# Patient Record
Sex: FEMALE | Race: WHITE | NOT HISPANIC OR LATINO | URBAN - METROPOLITAN AREA
[De-identification: names, ages, dates, MRNs, and addresses within clinical notes are randomized per-mention and may not be internally consistent; named-entity substitution may affect disease eponyms.]

---

## 2018-05-18 ENCOUNTER — OUTPATIENT (OUTPATIENT)
Dept: OUTPATIENT SERVICES | Facility: HOSPITAL | Age: 83
LOS: 1 days | Discharge: HOME | End: 2018-05-18

## 2018-05-18 DIAGNOSIS — S99.911A UNSPECIFIED INJURY OF RIGHT ANKLE, INITIAL ENCOUNTER: ICD-10-CM

## 2018-05-18 DIAGNOSIS — S93.402A SPRAIN OF UNSPECIFIED LIGAMENT OF LEFT ANKLE, INITIAL ENCOUNTER: ICD-10-CM

## 2018-05-18 DIAGNOSIS — M79.672 PAIN IN LEFT FOOT: ICD-10-CM

## 2018-05-23 DIAGNOSIS — N95.9 UNSPECIFIED MENOPAUSAL AND PERIMENOPAUSAL DISORDER: ICD-10-CM

## 2018-05-23 DIAGNOSIS — Z13.820 ENCOUNTER FOR SCREENING FOR OSTEOPOROSIS: ICD-10-CM

## 2018-05-30 DIAGNOSIS — M79.672 PAIN IN LEFT FOOT: ICD-10-CM

## 2018-05-30 DIAGNOSIS — S99.911A UNSPECIFIED INJURY OF RIGHT ANKLE, INITIAL ENCOUNTER: ICD-10-CM

## 2018-05-30 DIAGNOSIS — S93.402A SPRAIN OF UNSPECIFIED LIGAMENT OF LEFT ANKLE, INITIAL ENCOUNTER: ICD-10-CM

## 2018-05-30 DIAGNOSIS — Z02.9 ENCOUNTER FOR ADMINISTRATIVE EXAMINATIONS, UNSPECIFIED: ICD-10-CM

## 2018-06-22 ENCOUNTER — OUTPATIENT (OUTPATIENT)
Dept: OUTPATIENT SERVICES | Facility: HOSPITAL | Age: 83
LOS: 1 days | Discharge: HOME | End: 2018-06-22

## 2018-06-22 DIAGNOSIS — M79.671 PAIN IN RIGHT FOOT: ICD-10-CM

## 2018-07-23 ENCOUNTER — OUTPATIENT (OUTPATIENT)
Dept: OUTPATIENT SERVICES | Facility: HOSPITAL | Age: 83
LOS: 1 days | Discharge: HOME | End: 2018-07-23

## 2018-07-23 DIAGNOSIS — R05 COUGH: ICD-10-CM

## 2018-07-24 ENCOUNTER — OUTPATIENT (OUTPATIENT)
Dept: OUTPATIENT SERVICES | Facility: HOSPITAL | Age: 83
LOS: 1 days | Discharge: HOME | End: 2018-07-24

## 2018-07-24 DIAGNOSIS — R52 PAIN, UNSPECIFIED: ICD-10-CM

## 2018-08-15 ENCOUNTER — OUTPATIENT (OUTPATIENT)
Dept: OUTPATIENT SERVICES | Facility: HOSPITAL | Age: 83
LOS: 1 days | Discharge: HOME | End: 2018-08-15

## 2018-08-15 DIAGNOSIS — R52 PAIN, UNSPECIFIED: ICD-10-CM

## 2019-03-28 ENCOUNTER — OUTPATIENT (OUTPATIENT)
Dept: OUTPATIENT SERVICES | Facility: HOSPITAL | Age: 84
LOS: 1 days | Discharge: HOME | End: 2019-03-28

## 2019-03-28 DIAGNOSIS — M25.579 PAIN IN UNSPECIFIED ANKLE AND JOINTS OF UNSPECIFIED FOOT: ICD-10-CM

## 2019-04-18 ENCOUNTER — OUTPATIENT (OUTPATIENT)
Dept: OUTPATIENT SERVICES | Facility: HOSPITAL | Age: 84
LOS: 1 days | Discharge: HOME | End: 2019-04-18

## 2019-04-18 DIAGNOSIS — I10 ESSENTIAL (PRIMARY) HYPERTENSION: ICD-10-CM

## 2019-04-18 DIAGNOSIS — N39.0 URINARY TRACT INFECTION, SITE NOT SPECIFIED: ICD-10-CM

## 2019-04-18 DIAGNOSIS — N20.0 CALCULUS OF KIDNEY: ICD-10-CM

## 2019-04-18 DIAGNOSIS — E11.9 TYPE 2 DIABETES MELLITUS WITHOUT COMPLICATIONS: ICD-10-CM

## 2019-08-05 ENCOUNTER — OUTPATIENT (OUTPATIENT)
Dept: OUTPATIENT SERVICES | Facility: HOSPITAL | Age: 84
LOS: 1 days | Discharge: HOME | End: 2019-08-05
Payer: MEDICARE

## 2019-08-05 DIAGNOSIS — R52 PAIN, UNSPECIFIED: ICD-10-CM

## 2019-08-05 PROCEDURE — 73610 X-RAY EXAM OF ANKLE: CPT | Mod: 26,RT

## 2019-08-05 PROCEDURE — 73590 X-RAY EXAM OF LOWER LEG: CPT | Mod: 26,RT

## 2019-10-16 ENCOUNTER — OUTPATIENT (OUTPATIENT)
Dept: OUTPATIENT SERVICES | Facility: HOSPITAL | Age: 84
LOS: 1 days | Discharge: HOME | End: 2019-10-16
Payer: MEDICARE

## 2019-10-16 DIAGNOSIS — R05 COUGH: ICD-10-CM

## 2019-10-16 PROCEDURE — 71046 X-RAY EXAM CHEST 2 VIEWS: CPT | Mod: 26

## 2021-03-10 PROBLEM — Z00.00 ENCOUNTER FOR PREVENTIVE HEALTH EXAMINATION: Status: ACTIVE | Noted: 2021-03-10

## 2021-03-22 ENCOUNTER — APPOINTMENT (OUTPATIENT)
Dept: CARDIOLOGY | Facility: CLINIC | Age: 86
End: 2021-03-22
Payer: MEDICARE

## 2021-03-22 VITALS
HEART RATE: 61 BPM | TEMPERATURE: 98.9 F | BODY MASS INDEX: 28.52 KG/M2 | DIASTOLIC BLOOD PRESSURE: 58 MMHG | WEIGHT: 153 LBS | HEIGHT: 61.5 IN | SYSTOLIC BLOOD PRESSURE: 118 MMHG | RESPIRATION RATE: 16 BRPM | OXYGEN SATURATION: 97 %

## 2021-03-22 DIAGNOSIS — Z78.9 OTHER SPECIFIED HEALTH STATUS: ICD-10-CM

## 2021-03-22 PROCEDURE — 99204 OFFICE O/P NEW MOD 45 MIN: CPT

## 2021-03-22 PROCEDURE — 93000 ELECTROCARDIOGRAM COMPLETE: CPT

## 2021-03-22 RX ORDER — SIMVASTATIN 5 MG/1
5 TABLET, FILM COATED ORAL
Refills: 0 | Status: ACTIVE | COMMUNITY

## 2021-03-22 RX ORDER — AZELASTINE HYDROCHLORIDE 137 UG/1
0.1 SPRAY, METERED NASAL
Qty: 30 | Refills: 0 | Status: ACTIVE | COMMUNITY
Start: 2021-03-04

## 2021-03-22 RX ORDER — METFORMIN HYDROCHLORIDE 500 MG/1
500 TABLET, COATED ORAL
Qty: 180 | Refills: 2 | Status: ACTIVE | COMMUNITY

## 2021-03-22 RX ORDER — ALLOPURINOL 100 MG/1
100 TABLET ORAL DAILY
Qty: 30 | Refills: 0 | Status: ACTIVE | COMMUNITY

## 2021-03-22 RX ORDER — ICOSAPENT ETHYL 1000 MG/1
1 CAPSULE ORAL TWICE DAILY
Refills: 3 | Status: ACTIVE | COMMUNITY

## 2021-03-22 RX ORDER — METOPROLOL TARTRATE 25 MG/1
25 TABLET, FILM COATED ORAL
Qty: 180 | Refills: 3 | Status: ACTIVE | COMMUNITY

## 2021-03-22 RX ORDER — DONEPEZIL HYDROCHLORIDE 5 MG/1
5 TABLET ORAL
Refills: 0 | Status: ACTIVE | COMMUNITY

## 2021-03-22 RX ORDER — POLYETHYLENE GLYCOL 3350, SODIUM SULFATE, SODIUM CHLORIDE, POTASSIUM CHLORIDE, ASCORBIC ACID, SODIUM ASCORBATE 140-9-5.2G
140 KIT ORAL
Qty: 3 | Refills: 0 | Status: ACTIVE | COMMUNITY
Start: 2021-03-18

## 2021-03-22 RX ORDER — AMITRIPTYLINE HYDROCHLORIDE 10 MG/1
10 TABLET, FILM COATED ORAL
Refills: 0 | Status: ACTIVE | COMMUNITY

## 2021-03-22 RX ORDER — BRINZOLAMIDE 10 MG/ML
1 SUSPENSION/ DROPS OPHTHALMIC
Qty: 10 | Refills: 0 | Status: ACTIVE | COMMUNITY
Start: 2021-03-02

## 2021-03-22 RX ORDER — MEMANTINE HYDROCHLORIDE 7 MG/1
7 CAPSULE, EXTENDED RELEASE ORAL
Qty: 30 | Refills: 0 | Status: ACTIVE | COMMUNITY
Start: 2021-03-09

## 2021-03-22 RX ORDER — NITROGLYCERIN 40 MG/1
0.2 PATCH TRANSDERMAL
Qty: 30 | Refills: 0 | Status: ACTIVE | COMMUNITY
Start: 2021-01-10

## 2021-03-22 RX ORDER — FAMCICLOVIR 500 MG/1
500 TABLET, FILM COATED ORAL
Qty: 3 | Refills: 0 | Status: DISCONTINUED | COMMUNITY
Start: 2020-12-13

## 2021-03-22 RX ORDER — METHIMAZOLE 5 MG/1
5 TABLET ORAL DAILY
Qty: 45 | Refills: 1 | Status: ACTIVE | COMMUNITY

## 2021-03-22 RX ORDER — CLOTRIMAZOLE 10 MG/ML
1 SOLUTION TOPICAL
Qty: 30 | Refills: 0 | Status: DISCONTINUED | COMMUNITY
Start: 2020-09-29

## 2021-03-22 RX ORDER — NAPROXEN 500 MG/1
500 TABLET ORAL
Qty: 20 | Refills: 0 | Status: DISCONTINUED | COMMUNITY
Start: 2020-10-01

## 2021-03-22 RX ORDER — LATANOPROST/PF 0.005 %
0.01 DROPS OPHTHALMIC (EYE)
Qty: 2 | Refills: 0 | Status: ACTIVE | COMMUNITY
Start: 2021-03-01

## 2021-03-22 NOTE — HISTORY OF PRESENT ILLNESS
[FreeTextEntry1] : 87-yo female with h/o DM, HTN, hyperlipidemia. Patient has experienced episodes of left-sided chest pain and tightness, mostly at rest, which usually last a few minutes and gradually resolve. About 2 weeks ago, she had a more severe episode, which lasted almost 1 hour and eventually got better after she took Tylenol and put on a Nitro patch.\par \par Patient still does her own chores and some shopping. She does feel SOB when she walks outside.

## 2021-03-22 NOTE — ASSESSMENT
[FreeTextEntry1] : 87-yo female with multiple risk factors for CAD, episodes of chest pain and GONZALEZ.\par \par Plan:\par Continue treatment.\par 2D ECHO.\par Adenosine MPI.\par F/u in 1 month.\par \par Luis Mcpherson MD\par

## 2021-03-22 NOTE — PHYSICAL EXAM
[General Appearance - Well Developed] : well developed [Normal Appearance] : normal appearance [Well Groomed] : well groomed [General Appearance - Well Nourished] : well nourished [No Deformities] : no deformities [General Appearance - In No Acute Distress] : no acute distress [Normal Conjunctiva] : the conjunctiva exhibited no abnormalities [Heart Rate And Rhythm] : heart rate and rhythm were normal [Heart Sounds] : normal S1 and S2 [Arterial Pulses Normal] : the arterial pulses were normal [Edema] : no peripheral edema present [FreeTextEntry1] : S4 present, soft SM at the AV [Respiration, Rhythm And Depth] : normal respiratory rhythm and effort [Exaggerated Use Of Accessory Muscles For Inspiration] : no accessory muscle use [Auscultation Breath Sounds / Voice Sounds] : lungs were clear to auscultation bilaterally [Bowel Sounds] : normal bowel sounds [Abdomen Soft] : soft [Abdomen Tenderness] : non-tender [Nail Clubbing] : no clubbing of the fingernails [Cyanosis, Localized] : no localized cyanosis [Skin Color & Pigmentation] : normal skin color and pigmentation [] : no rash [Oriented To Time, Place, And Person] : oriented to person, place, and time

## 2021-04-06 ENCOUNTER — APPOINTMENT (OUTPATIENT)
Dept: CARDIOLOGY | Facility: CLINIC | Age: 86
End: 2021-04-06
Payer: MEDICARE

## 2021-04-06 PROCEDURE — 93306 TTE W/DOPPLER COMPLETE: CPT

## 2021-04-08 ENCOUNTER — RESULT REVIEW (OUTPATIENT)
Age: 86
End: 2021-04-08

## 2021-04-08 ENCOUNTER — OUTPATIENT (OUTPATIENT)
Dept: OUTPATIENT SERVICES | Facility: HOSPITAL | Age: 86
LOS: 1 days | Discharge: HOME | End: 2021-04-08
Payer: MEDICARE

## 2021-04-08 DIAGNOSIS — E78.5 HYPERLIPIDEMIA, UNSPECIFIED: ICD-10-CM

## 2021-04-08 DIAGNOSIS — R07.89 OTHER CHEST PAIN: ICD-10-CM

## 2021-04-08 PROCEDURE — 78452 HT MUSCLE IMAGE SPECT MULT: CPT | Mod: 26,MH

## 2021-04-08 PROCEDURE — 93018 CV STRESS TEST I&R ONLY: CPT

## 2021-05-07 ENCOUNTER — APPOINTMENT (OUTPATIENT)
Dept: CARDIOLOGY | Facility: CLINIC | Age: 86
End: 2021-05-07
Payer: MEDICARE

## 2021-05-07 ENCOUNTER — RESULT CHARGE (OUTPATIENT)
Age: 86
End: 2021-05-07

## 2021-05-07 VITALS
BODY MASS INDEX: 28.89 KG/M2 | WEIGHT: 153 LBS | DIASTOLIC BLOOD PRESSURE: 60 MMHG | HEIGHT: 61 IN | SYSTOLIC BLOOD PRESSURE: 118 MMHG | HEART RATE: 66 BPM

## 2021-05-07 DIAGNOSIS — R94.31 ABNORMAL ELECTROCARDIOGRAM [ECG] [EKG]: ICD-10-CM

## 2021-05-07 DIAGNOSIS — R06.02 SHORTNESS OF BREATH: ICD-10-CM

## 2021-05-07 DIAGNOSIS — R05 COUGH: ICD-10-CM

## 2021-05-07 PROCEDURE — 93000 ELECTROCARDIOGRAM COMPLETE: CPT

## 2021-05-07 PROCEDURE — 99213 OFFICE O/P EST LOW 20 MIN: CPT

## 2021-05-07 NOTE — PHYSICAL EXAM
[Well Developed] : well developed [Well Nourished] : well nourished [No Acute Distress] : no acute distress [Normal Conjunctiva] : normal conjunctiva [Normal Venous Pressure] : normal venous pressure [No Carotid Bruit] : no carotid bruit [Normal S1, S2] : normal S1, S2 [No Murmur] : no murmur [No Rub] : no rub [S4] : S4 [Clear Lung Fields] : clear lung fields [Good Air Entry] : good air entry [No Respiratory Distress] : no respiratory distress  [Soft] : abdomen soft [Non Tender] : non-tender [Normal Bowel Sounds] : normal bowel sounds [Normal Gait] : normal gait [No Edema] : no edema [No Cyanosis] : no cyanosis [No Clubbing] : no clubbing [No Varicosities] : no varicosities [No Rash] : no rash [Moves all extremities] : moves all extremities [No Focal Deficits] : no focal deficits [Normal Speech] : normal speech [Alert and Oriented] : alert and oriented [Normal memory] : normal memory

## 2021-05-07 NOTE — HISTORY OF PRESENT ILLNESS
[FreeTextEntry1] : 87-yo female with h/o DM, HTN, hyperlipidemia. SInce last visit, patient has spent 2 weeks on vacation in [place]. She spent a lot of time outside, she walked and swam in the pool. She generally feels much better, denies CP, SOB. She still c/o dry cough.

## 2021-05-07 NOTE — ASSESSMENT
[FreeTextEntry1] : 87-yo female:\par Normal LVEF and diastolic function.\par No evidence of CAD.\par ? etiology of persistent cough. \par \par Plan:\par Continue treatment.\par Patient will f/u with PMD for additional evaluation and treatment.\par \par Luis Mcpherson MD\par

## 2021-05-07 NOTE — REVIEW OF SYSTEMS
[Cough] : cough [Wheezing] : no wheezing [Rash] : no rash [Anxiety] : no anxiety [Negative] : Neurological

## 2021-05-13 ENCOUNTER — APPOINTMENT (OUTPATIENT)
Dept: CARDIOLOGY | Facility: CLINIC | Age: 86
End: 2021-05-13

## 2022-07-06 ENCOUNTER — APPOINTMENT (OUTPATIENT)
Dept: CARDIOLOGY | Facility: CLINIC | Age: 87
End: 2022-07-06

## 2022-07-06 VITALS
WEIGHT: 156 LBS | SYSTOLIC BLOOD PRESSURE: 140 MMHG | DIASTOLIC BLOOD PRESSURE: 80 MMHG | HEIGHT: 61 IN | BODY MASS INDEX: 29.45 KG/M2

## 2022-07-06 DIAGNOSIS — E78.5 HYPERLIPIDEMIA, UNSPECIFIED: ICD-10-CM

## 2022-07-06 DIAGNOSIS — E11.9 TYPE 2 DIABETES MELLITUS W/OUT COMPLICATIONS: ICD-10-CM

## 2022-07-06 DIAGNOSIS — I10 ESSENTIAL (PRIMARY) HYPERTENSION: ICD-10-CM

## 2022-07-06 DIAGNOSIS — R07.9 CHEST PAIN, UNSPECIFIED: ICD-10-CM

## 2022-07-06 PROCEDURE — 93000 ELECTROCARDIOGRAM COMPLETE: CPT

## 2022-07-06 PROCEDURE — 99214 OFFICE O/P EST MOD 30 MIN: CPT

## 2022-07-06 RX ORDER — SUCRALFATE 1 G/1
1 TABLET ORAL
Qty: 30 | Refills: 0 | Status: DISCONTINUED | COMMUNITY
Start: 2022-03-07

## 2022-07-06 RX ORDER — DOXEPIN HYDROCHLORIDE 100 MG/1
100 CAPSULE ORAL
Qty: 108 | Refills: 0 | Status: DISCONTINUED | COMMUNITY
Start: 2022-01-24

## 2022-07-06 RX ORDER — PANTOPRAZOLE 40 MG/1
40 TABLET, DELAYED RELEASE ORAL
Qty: 30 | Refills: 0 | Status: ACTIVE | COMMUNITY
Start: 2022-03-16

## 2022-07-06 RX ORDER — DICLOFENAC SODIUM 1% 10 MG/G
1 GEL TOPICAL
Qty: 100 | Refills: 0 | Status: DISCONTINUED | COMMUNITY
Start: 2022-02-25

## 2022-07-06 RX ORDER — OLMESARTAN MEDOXOMIL 5 MG/1
5 TABLET, FILM COATED ORAL DAILY
Refills: 0 | Status: DISCONTINUED | COMMUNITY
End: 2022-07-06

## 2022-07-06 RX ORDER — OLMESARTAN MEDOXOMIL 20 MG/1
20 TABLET, FILM COATED ORAL
Qty: 30 | Refills: 0 | Status: ACTIVE | COMMUNITY
Start: 2022-06-22

## 2022-07-06 RX ORDER — RIFAXIMIN 550 MG/1
550 TABLET ORAL
Qty: 42 | Refills: 0 | Status: DISCONTINUED | COMMUNITY
Start: 2022-01-31

## 2022-07-06 RX ORDER — PANTOPRAZOLE 20 MG/1
20 TABLET, DELAYED RELEASE ORAL DAILY
Refills: 0 | Status: DISCONTINUED | COMMUNITY
End: 2022-07-06

## 2022-07-06 NOTE — CARDIOLOGY SUMMARY
[de-identified] : 07/06/22:\par SR, LAE and LVH, poor R wave progression. [de-identified] : 04/06/21:\par LVEF 65%, normal diastolic  function\par Mild MR, TR.

## 2022-07-06 NOTE — ASSESSMENT
[FreeTextEntry1] : 87-yo female:\par Negative CAD w/u in 2021.\par Episode of left-sided chest pain after drinking PO contrast for CT abdomen, GI etiology is likely.\par DM, HTN, hyperlipidemia.\par \par Plan:\par Continue treatment.\par 2D ECHO, no further evaluation if LV function stable.\par \par Luis Mcpherson MD\par

## 2022-07-06 NOTE — REVIEW OF SYSTEMS
[Negative] : Neurological [Blurry Vision] : no blurred vision [Chest Discomfort] : chest discomfort [Lower Ext Edema] : no extremity edema [Palpitations] : no palpitations [Syncope] : no syncope [Cough] : no cough [Wheezing] : no wheezing [Anxiety] : no anxiety

## 2022-07-07 ENCOUNTER — RESULT CHARGE (OUTPATIENT)
Age: 87
End: 2022-07-07

## 2022-07-08 ENCOUNTER — APPOINTMENT (OUTPATIENT)
Dept: CARDIOLOGY | Facility: CLINIC | Age: 87
End: 2022-07-08

## 2022-08-30 ENCOUNTER — EMERGENCY (EMERGENCY)
Facility: HOSPITAL | Age: 87
LOS: 0 days | Discharge: HOME | End: 2022-08-30
Attending: EMERGENCY MEDICINE | Admitting: EMERGENCY MEDICINE

## 2022-08-30 VITALS
HEART RATE: 61 BPM | DIASTOLIC BLOOD PRESSURE: 65 MMHG | SYSTOLIC BLOOD PRESSURE: 149 MMHG | OXYGEN SATURATION: 99 % | RESPIRATION RATE: 18 BRPM | TEMPERATURE: 99 F

## 2022-08-30 DIAGNOSIS — I10 ESSENTIAL (PRIMARY) HYPERTENSION: ICD-10-CM

## 2022-08-30 DIAGNOSIS — M25.561 PAIN IN RIGHT KNEE: ICD-10-CM

## 2022-08-30 DIAGNOSIS — M19.90 UNSPECIFIED OSTEOARTHRITIS, UNSPECIFIED SITE: ICD-10-CM
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
## 2022-08-30 DIAGNOSIS — E78.5 HYPERLIPIDEMIA, UNSPECIFIED: ICD-10-CM

## 2022-08-30 DIAGNOSIS — E11.9 TYPE 2 DIABETES MELLITUS WITHOUT COMPLICATIONS: ICD-10-CM
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
## 2022-08-30 PROCEDURE — 73502 X-RAY EXAM HIP UNI 2-3 VIEWS: CPT | Mod: 26,RT

## 2022-08-30 PROCEDURE — 99284 EMERGENCY DEPT VISIT MOD MDM: CPT | Mod: GC

## 2022-08-30 PROCEDURE — 73562 X-RAY EXAM OF KNEE 3: CPT | Mod: 26,RT

## 2022-08-30 PROCEDURE — 93971 EXTREMITY STUDY: CPT | Mod: 26,RT

## 2022-08-30 RX ORDER — IBUPROFEN 200 MG
600 TABLET ORAL ONCE
Refills: 0 | Status: COMPLETED | OUTPATIENT
Start: 2022-08-30 | End: 2022-08-30

## 2022-08-30 RX ADMIN — Medication 600 MILLIGRAM(S): at 16:14

## 2022-08-30 NOTE — ED PROVIDER NOTE - NS ED ROS FT
Constitutional: No fever  Eyes:  No visual changes, eye pain, or discharge.  ENMT:  No hearing changes, earpain, sore throat, runny nose, or difficulty swallowing  Cardiac:  No chest pain, SOB or edema. No chest pain with exertion.  Respiratory:  No cough or respiratory distress.   GI:  No nausea, vomiting, diarrhea or abdominal pain.  :  No dysuria, frequency or burning.  MS: +knee pain. No myalgia, muscle weakness, back pain.  Neuro:  No headache or weakness.  No LOC.  Skin:  No skin rash.

## 2022-08-30 NOTE — ED PROVIDER NOTE - ATTENDING CONTRIBUTION TO CARE
88-year-old female PMH DM, HTN, glaucoma, elevated cholesterol presented for evaluation of nontraumatic right buttock pain radiating to her knee for the past several days.  Patient states that on Saturday she did a lot of housework, stated that she cleaned the entire apartment, climbed on stepstools and ladders, the following day still experiencing pain.  It is worse with palpation and ambulation.  The following day she took a hot bath which did not help, the following day apply diclofenac cream to the knee without any improvement in pain.  She is able to ambulate independently. She was advised  by a nurse  who works  at a  the patient goes to to come to the emergency room for evaluation.  Patient is supposed to travel to [place] tomorrow for about 6 days.  Denies any fever /chills, urinary complaints, no back or abdominal pain, no focal weakness or paresthesias, no leg swelling or any other additional complaints.  Well-appearing well-nourished elderly female in NAD, head AT/NC, PERRL, pink conjunctivae,  mmm, nml oropharynx, nml phonation without drooling or trismus, supple neck without midline spine ttp, nml work of breathing, lungs CTA b/l, equal air entry, RRR, well-perfused extremities, distal pulses intact, abdomen soft, NT/ND, BS present in all quadrants, no midline spine or CVA ttp, no leg edema or unilateral calf swelling, + ttp at the rt sciatic notch,  FROM at all joints, A&Ox3, no focal neuro deficits, nml mood and affect. Impression: most likely right sciatica.  No red flags for back pain.  Plan: Analgesia, x-ray of the knee, lower extremity duplex.  Patient was advised to take OTC ibuprofen 400 mg 3 times a day for the next several days, will give contact info to physical therapy.  She was also advised to follow-up with her primary care doctor, Dr. Upton.  Patient is happy with the plan. All of her questions were answered, concerns addressed.  [place]  used during the entire encounter.

## 2022-08-30 NOTE — ED PROVIDER NOTE - OBJECTIVE STATEMENT
87 y/o F with PMH HTN, HLD, DM, arthritis presenting with right knee pain x2 days. Pt states she was doing a lot of cleaning at home, was up and down stool often, and noticed pain in right knee morning after. Pain is radiating up to right hip and right lower back. Denies abd pain, difficulty ambulating, fever, weakness, numbness. Has been applying topical diclofenac with some relief.

## 2022-08-30 NOTE — ED PROVIDER NOTE - CLINICAL SUMMARY MEDICAL DECISION MAKING FREE TEXT BOX
Patient with right-sided sciatic,, neurologically intact, and: Analgesia, lower extremity duplex, follow-up with PT and PMD.

## 2022-08-30 NOTE — ED PROVIDER NOTE - PHYSICAL EXAMINATION
CONSTITUTIONAL: Well-developed; well-nourished; in no acute distress.   SKIN: Warm, dry  HEAD: Normocephalic; atraumatic  EYES: PERRL, EOMI, normal sclera and conjunctiva   ENT: No nasal discharge; airway clear.  NECK: Supple; non tender.  CARD:  Regular rate and rhythm. Normal S1, S2. 2+ distal pulses. No LE edema.   RESP: No increased WOB. CTA b/l without wheezes, crackles, rhonchi  ABD: Normoactive BS. Soft, nontender, nondistended.  EXT: Normal ROM. Right knee without deformity, nontender, FROM but painful. Right hip without deformity, nontender, FROM but painful  LYMPH: No acute cervical adenopathy.  NEURO: Alert, oriented, grossly unremarkable  PSYCH: Cooperative, appropriate.

## 2022-08-30 NOTE — ED PROVIDER NOTE - NSFOLLOWUPINSTRUCTIONS_ED_ALL_ED_FT
Sciatica    WHAT YOU NEED TO KNOW:    What is sciatica? Sciatica is a condition that causes pain along your sciatic nerve. The sciatic nerve runs from your spine through both sides of your buttocks. It then runs down the back of your thigh, into your lower leg and foot. Any place along your sciatic nerve may be compressed, inflamed, irritated, or stretched.    What causes sciatica? Sciatica may be related to certain activities, poor posture, and physical or psychological stress. Any of the following may cause or increase your risk for sciatica:  •A slipped disc or narrowed spinal column that presses on the sciatic nerve      •Muscle injury after you twist or lift a heavy object      •Swelling from sprained or irritated muscles that press on the sciatic nerve      •Extra body weight that increases pressure on your back and legs      •A direct blow on the buttocks, thighs, or legs, car accidents, or falls that injure the sciatic nerve      •A disease of the spine, such as arthritis, osteoporosis, or cancer      What are the signs and symptoms of sciatica? The symptoms of sciatica may be short-term or long-term:  •Pain that goes from the lower back into your buttocks and down the back of your thigh      •Numbness or tingling in your buttocks and legs      •Muscle weakness, difficulty moving or controlling your leg or foot      •Leg pain that increases with standing, sitting, or squatting      How is sciatica diagnosed? Your healthcare provider will ask about other health conditions you may have. Tell your provider about your job, history of back pain, diseases, or surgeries you have had. Your provider will examine you and move your legs to see what increases pain. You may also need any of the following:  •X-rays of your back, hip, thigh, or leg may show other problems, such as fractures (broken bones).      •A CT scan or MRI may show your sciatic nerve, muscles, and blood vessels. You may be given contrast liquid to help the area show up better in pictures. Tell the healthcare provider if you have ever had an allergic reaction to contrast liquid. Do not enter the MRI room with anything metal. Metal can cause serious injury. Tell the healthcare provider if you have any metal in or on your body.      •An electromyography (EMG) test measures the electrical activity of your muscles at rest and with movement.      •Nerve conduction tests check how surface nerves and related muscles respond to stimulation. Electrodes with wires or tiny needles are placed on certain areas, such as the buttocks and legs.      How is sciatica treated?   •NSAIDs, such as ibuprofen, help decrease swelling, pain, and fever. This medicine is available with or without a doctor's order. NSAIDs can cause stomach bleeding or kidney problems in certain people. If you take blood thinner medicine, always ask your healthcare provider if NSAIDs are safe for you. Always read the medicine label and follow directions.      •Acetaminophen decreases pain and fever. It is available without a doctor's order. Ask how much to take and how often to take it. Follow directions. Read the labels of all other medicines you are using to see if they also contain acetaminophen, or ask your doctor or pharmacist. Acetaminophen can cause liver damage if not taken correctly.      •Muscle relaxers help decrease pain and muscle spasms.      •Ultrasound therapy is a machine that uses sound waves to decrease pain. Topical medicines may be added to help decrease pain and inflammation.      •Epidural steroid medicine may include both an anesthetic (numbing medicine) and a steroid. A steroid may decrease swelling and relieve pain. It is given as a shot close to the spine in the area where you have pain.      •Chemonucleolysis is an injection given into the damaged disc to soften or shrink the disc.      •Surgery may be done to correct problems such as a damaged disc or a tumor in your spine. Surgery may be done to decrease the pressure on the sciatic nerve. Healthcare providers may also release the muscle that may be pressing into your sciatic nerve.      How can I help manage sciatica?   •Physical or occupational therapy may be recommended. A physical therapist teaches you exercises to help improve movement and strength, and to decrease pain. An occupational therapist teaches you skills to help with your daily activities.      •Assistive devices may make you more comfortable or relieve pressure in the area. You may need back support, such as a back brace. You may need crutches, a cane, or a walker to decrease stress on your lower back and leg muscles. Ask your healthcare provider for more information about assistive devices and how to use them correctly.      How can sciatica be prevented?   •Avoid pressure on your back and legs. Do not lift heavy objects, or stand or sit for long periods of time.      •Lift objects safely. Keep your back straight and bend your knees when you  an object. Do not bend or twist your back when you lift.  How to Lift Items Safely           •Maintain a healthy weight. Ask your healthcare provider what a healthy weight is for you. Your provider can help you create a weight loss plan, if needed.      •Exercise as directed. Ask your healthcare provider about the best stretching, warmup, and exercise plan for you.      When should I seek immediate care?   •You have trouble controlling your urine or bowel movements.      •You have weakness in both legs.      •You have numbness in your groin or buttocks.      When should I call my doctor?   •You have pain in your lower back at night or when resting.      •You have pain in your lower back with numbness below the knee.      •You have weakness in one leg only.      •You have questions or concerns about your condition or care.      CARE AGREEMENT:    You have the right to help plan your care. Learn about your health condition and how it may be treated. Discuss treatment options with your healthcare providers to decide what care you want to receive. You always have the right to refuse treatment

## 2022-08-30 NOTE — ED ADULT NURSE NOTE - INTERVENTIONS DEFINITIONS
[place] to call system/Call bell, personal items and telephone within reach/Instruct patient to call for assistance/Room bathroom lighting operational/Non-slip footwear when patient is off stretcher/Physically safe environment: no spills, clutter or unnecessary equipment/Stretcher in lowest position, wheels locked, appropriate side rails in place/Provide visual cue, wrist band, yellow gown, etc./Monitor gait and stability/Monitor for mental status changes and reorient to person, place, and time/Review medications for side effects contributing to fall risk/Reinforce activity limits and safety measures with patient and family/Provide visual clues: red socks

## 2022-08-30 NOTE — ED PROVIDER NOTE - PATIENT PORTAL LINK FT
You can access the FollowMyHealth Patient Portal offered by [facility] by registering at the following website: [email or web address]. By joining [employer]’s FollowMyHealth portal, you will also be able to view your health information using other applications (apps) compatible with our system.

## 2022-08-30 NOTE — ED PROVIDER NOTE - PROGRESS NOTE DETAILS
EP: x-ray of the hip and knee appear negative for acute pathology, DVT study is negative as well.  Patient stable for discharge home, advised to follow-up with her primary care doctor and with physical therapy.  Strict return precautions given.

## 2022-08-30 NOTE — ED ADULT TRIAGE NOTE - CHIEF COMPLAINT QUOTE
Pt BIBA for right back pain radiating down leg. as per EMS pt ambulatory on scene. denies any injury

## 2022-10-14 ENCOUNTER — APPOINTMENT (OUTPATIENT)
Dept: CARDIOLOGY | Facility: CLINIC | Age: 87
End: 2022-10-14